# Patient Record
Sex: MALE | Race: BLACK OR AFRICAN AMERICAN | Employment: OTHER | URBAN - METROPOLITAN AREA
[De-identification: names, ages, dates, MRNs, and addresses within clinical notes are randomized per-mention and may not be internally consistent; named-entity substitution may affect disease eponyms.]

---

## 2018-07-01 ENCOUNTER — APPOINTMENT (OUTPATIENT)
Dept: GENERAL RADIOLOGY | Facility: HOSPITAL | Age: 73
DRG: 293 | End: 2018-07-01
Attending: EMERGENCY MEDICINE
Payer: MEDICARE

## 2018-07-01 ENCOUNTER — HOSPITAL ENCOUNTER (INPATIENT)
Facility: HOSPITAL | Age: 73
LOS: 3 days | Discharge: HOME HEALTH | DRG: 293 | End: 2018-07-04
Attending: EMERGENCY MEDICINE | Admitting: INTERNAL MEDICINE
Payer: MEDICARE

## 2018-07-01 DIAGNOSIS — R77.8 ELEVATED TROPONIN: ICD-10-CM

## 2018-07-01 DIAGNOSIS — I50.9 ACUTE ON CHRONIC CONGESTIVE HEART FAILURE, UNSPECIFIED HEART FAILURE TYPE (HCC): Primary | ICD-10-CM

## 2018-07-01 PROBLEM — D64.9 ANEMIA: Status: ACTIVE | Noted: 2018-07-01

## 2018-07-01 PROBLEM — R79.89 ELEVATED TROPONIN: Status: ACTIVE | Noted: 2018-07-01

## 2018-07-01 PROBLEM — R73.9 HYPERGLYCEMIA: Status: ACTIVE | Noted: 2018-07-01

## 2018-07-01 PROCEDURE — 84484 ASSAY OF TROPONIN QUANT: CPT | Performed by: EMERGENCY MEDICINE

## 2018-07-01 PROCEDURE — 80061 LIPID PANEL: CPT | Performed by: EMERGENCY MEDICINE

## 2018-07-01 PROCEDURE — 85610 PROTHROMBIN TIME: CPT | Performed by: INTERNAL MEDICINE

## 2018-07-01 PROCEDURE — 83036 HEMOGLOBIN GLYCOSYLATED A1C: CPT | Performed by: INTERNAL MEDICINE

## 2018-07-01 PROCEDURE — 71045 X-RAY EXAM CHEST 1 VIEW: CPT | Performed by: EMERGENCY MEDICINE

## 2018-07-01 PROCEDURE — 93010 ELECTROCARDIOGRAM REPORT: CPT | Performed by: EMERGENCY MEDICINE

## 2018-07-01 PROCEDURE — 93005 ELECTROCARDIOGRAM TRACING: CPT

## 2018-07-01 PROCEDURE — 84484 ASSAY OF TROPONIN QUANT: CPT | Performed by: INTERNAL MEDICINE

## 2018-07-01 PROCEDURE — 80048 BASIC METABOLIC PNL TOTAL CA: CPT | Performed by: EMERGENCY MEDICINE

## 2018-07-01 PROCEDURE — 80053 COMPREHEN METABOLIC PANEL: CPT | Performed by: INTERNAL MEDICINE

## 2018-07-01 PROCEDURE — 85025 COMPLETE CBC W/AUTO DIFF WBC: CPT | Performed by: INTERNAL MEDICINE

## 2018-07-01 PROCEDURE — 85025 COMPLETE CBC W/AUTO DIFF WBC: CPT | Performed by: EMERGENCY MEDICINE

## 2018-07-01 PROCEDURE — 82962 GLUCOSE BLOOD TEST: CPT

## 2018-07-01 PROCEDURE — 96374 THER/PROPH/DIAG INJ IV PUSH: CPT

## 2018-07-01 PROCEDURE — 99285 EMERGENCY DEPT VISIT HI MDM: CPT

## 2018-07-01 PROCEDURE — 83880 ASSAY OF NATRIURETIC PEPTIDE: CPT | Performed by: EMERGENCY MEDICINE

## 2018-07-01 PROCEDURE — 85730 THROMBOPLASTIN TIME PARTIAL: CPT | Performed by: INTERNAL MEDICINE

## 2018-07-01 PROCEDURE — A4216 STERILE WATER/SALINE, 10 ML: HCPCS

## 2018-07-01 RX ORDER — METOPROLOL SUCCINATE 200 MG/1
200 TABLET, EXTENDED RELEASE ORAL DAILY
Status: ON HOLD | COMMUNITY
End: 2018-07-01 | Stop reason: CLARIF

## 2018-07-01 RX ORDER — FUROSEMIDE 10 MG/ML
40 INJECTION INTRAMUSCULAR; INTRAVENOUS 3 TIMES DAILY
Status: DISCONTINUED | OUTPATIENT
Start: 2018-07-01 | End: 2018-07-03

## 2018-07-01 RX ORDER — FUROSEMIDE 20 MG/1
20 TABLET ORAL 3 TIMES DAILY
Status: DISCONTINUED | OUTPATIENT
Start: 2018-07-01 | End: 2018-07-01

## 2018-07-01 RX ORDER — FUROSEMIDE 20 MG/1
20 TABLET ORAL 3 TIMES DAILY
Status: ON HOLD | COMMUNITY
End: 2018-07-04

## 2018-07-01 RX ORDER — METOPROLOL SUCCINATE 25 MG/1
25 TABLET, EXTENDED RELEASE ORAL DAILY
Status: DISCONTINUED | OUTPATIENT
Start: 2018-07-01 | End: 2018-07-02

## 2018-07-01 RX ORDER — ASPIRIN 81 MG/1
324 TABLET, CHEWABLE ORAL ONCE
Status: COMPLETED | OUTPATIENT
Start: 2018-07-01 | End: 2018-07-01

## 2018-07-01 RX ORDER — BENZONATATE 100 MG/1
200 CAPSULE ORAL 3 TIMES DAILY PRN
Status: DISCONTINUED | OUTPATIENT
Start: 2018-07-01 | End: 2018-07-04

## 2018-07-01 RX ORDER — FUROSEMIDE 10 MG/ML
40 INJECTION INTRAMUSCULAR; INTRAVENOUS ONCE
Status: COMPLETED | OUTPATIENT
Start: 2018-07-01 | End: 2018-07-01

## 2018-07-01 RX ORDER — DICYCLOMINE HCL 20 MG
20 TABLET ORAL 3 TIMES DAILY PRN
Status: DISCONTINUED | OUTPATIENT
Start: 2018-07-01 | End: 2018-07-04

## 2018-07-01 RX ORDER — NITROGLYCERIN 0.4 MG/1
0.4 TABLET SUBLINGUAL ONCE
Status: COMPLETED | OUTPATIENT
Start: 2018-07-01 | End: 2018-07-01

## 2018-07-01 RX ORDER — PANTOPRAZOLE SODIUM 40 MG/1
40 TABLET, DELAYED RELEASE ORAL
Status: DISCONTINUED | OUTPATIENT
Start: 2018-07-02 | End: 2018-07-04

## 2018-07-01 RX ORDER — LOSARTAN POTASSIUM 50 MG/1
50 TABLET ORAL DAILY
Status: DISCONTINUED | OUTPATIENT
Start: 2018-07-01 | End: 2018-07-01

## 2018-07-01 RX ORDER — HEPARIN SODIUM 5000 [USP'U]/ML
5000 INJECTION, SOLUTION INTRAVENOUS; SUBCUTANEOUS EVERY 8 HOURS SCHEDULED
Status: DISCONTINUED | OUTPATIENT
Start: 2018-07-01 | End: 2018-07-04

## 2018-07-01 RX ORDER — LOSARTAN POTASSIUM 25 MG/1
50 TABLET ORAL DAILY
Status: ON HOLD | COMMUNITY
End: 2018-07-04

## 2018-07-01 RX ORDER — ACETAMINOPHEN 325 MG/1
650 TABLET ORAL EVERY 6 HOURS PRN
Status: DISCONTINUED | OUTPATIENT
Start: 2018-07-01 | End: 2018-07-04

## 2018-07-01 RX ORDER — METOPROLOL SUCCINATE 25 MG/1
25 TABLET, EXTENDED RELEASE ORAL DAILY
Status: ON HOLD | COMMUNITY
End: 2018-07-04

## 2018-07-01 RX ORDER — LOSARTAN POTASSIUM 100 MG/1
100 TABLET ORAL DAILY
Status: DISCONTINUED | OUTPATIENT
Start: 2018-07-02 | End: 2018-07-02

## 2018-07-01 RX ORDER — CETIRIZINE HYDROCHLORIDE 10 MG/1
10 TABLET ORAL DAILY
Status: DISCONTINUED | OUTPATIENT
Start: 2018-07-01 | End: 2018-07-04

## 2018-07-01 RX ORDER — FUROSEMIDE 40 MG/1
40 TABLET ORAL 3 TIMES DAILY
Status: DISCONTINUED | OUTPATIENT
Start: 2018-07-01 | End: 2018-07-01

## 2018-07-01 RX ORDER — AMLODIPINE BESYLATE 2.5 MG/1
2.5 TABLET ORAL DAILY
Status: ON HOLD | COMMUNITY
End: 2018-07-01 | Stop reason: CLARIF

## 2018-07-01 RX ORDER — 0.9 % SODIUM CHLORIDE 0.9 %
VIAL (ML) INJECTION
Status: COMPLETED
Start: 2018-07-01 | End: 2018-07-01

## 2018-07-01 NOTE — PLAN OF CARE
Problem: Patient Centered Care  Goal: Patient preferences are identified and integrated in the patient's plan of care  Interventions:  - What would you like us to know as we care for you? I live in [place] with my wife.  I did not like my treatment with my [facility] or at baseline  INTERVENTIONS:  - Continuous cardiac monitoring, monitor vital signs, obtain 12 lead EKG if indicated  - Evaluate effectiveness of antiarrhythmic and heart rate control medications as ordered  - Initiate emergency measures for life threaten

## 2018-07-01 NOTE — CONSULTS
[facility]    Report of Consultation    Alba Aranda Patient Status:  Inpatient    1945 MRN [id number]   Location [facility] 3W/SW Attending Mimi Martin MD   Hosp Day # 0 PCP No primary care provider on file. Admission:  furosemide 20 MG Oral Tab Take 20 mg by mouth 3 (three) times daily. Losartan Potassium 25 MG Oral Tab Take 50 mg by mouth daily. Metoprolol Succinate ER 25 MG Oral Tablet 24 Hr Take 25 mg by mouth daily.        Allergies    Iodinated Conda Wilbert  07/01/2018   CO2 28 07/01/2018    (H) 07/01/2018   CA 8.8 07/01/2018   TROP 0.03 07/01/2018         Imaging:  Xr Chest Ap/pa (1 View) (cpt=71045)    Result Date: 7/1/2018  CONCLUSION:   Moderate interstitial edema.   Preliminary report was s

## 2018-07-01 NOTE — CONSULTS
Cardiology consult    Case reviewed and discussed with ER doctor, we will do an echo , increase lasix 40 IV TID and see patient today, full consult will follow       Thank you for allowing me to participate in the care of your patient.  please call if you h

## 2018-07-02 ENCOUNTER — APPOINTMENT (OUTPATIENT)
Dept: CV DIAGNOSTICS | Facility: HOSPITAL | Age: 73
DRG: 293 | End: 2018-07-02
Attending: HOSPITALIST
Payer: MEDICARE

## 2018-07-02 PROCEDURE — 82962 GLUCOSE BLOOD TEST: CPT

## 2018-07-02 PROCEDURE — A4216 STERILE WATER/SALINE, 10 ML: HCPCS

## 2018-07-02 PROCEDURE — 93306 TTE W/DOPPLER COMPLETE: CPT | Performed by: HOSPITALIST

## 2018-07-02 PROCEDURE — 85027 COMPLETE CBC AUTOMATED: CPT | Performed by: INTERNAL MEDICINE

## 2018-07-02 PROCEDURE — 80053 COMPREHEN METABOLIC PANEL: CPT | Performed by: INTERNAL MEDICINE

## 2018-07-02 RX ORDER — METOPROLOL SUCCINATE 50 MG/1
50 TABLET, EXTENDED RELEASE ORAL DAILY
Status: DISCONTINUED | OUTPATIENT
Start: 2018-07-03 | End: 2018-07-04

## 2018-07-02 RX ORDER — SPIRONOLACTONE 25 MG/1
25 TABLET ORAL DAILY
Status: DISCONTINUED | OUTPATIENT
Start: 2018-07-02 | End: 2018-07-04

## 2018-07-02 RX ORDER — 0.9 % SODIUM CHLORIDE 0.9 %
VIAL (ML) INJECTION
Status: COMPLETED
Start: 2018-07-02 | End: 2018-07-02

## 2018-07-02 NOTE — PROGRESS NOTES
Cardiology progress note     7/2/2018    Objective finding: record recovered from [place]    Physical Exam:   Blood pressure 143/72, pulse 95, temperature 98.4 °F (36.9 °C), temperature source Oral, resp.  rate 20, height 177.8 cm (5' 10\"), weight 197 lb 1.6 oz Elizabeth Hayes MD on 7/01/2018 at 7:41           [facility] 5/2018 normal coronaries from the [address]  EKG NSR, LVH, nonspecific ST-T wave changes     Impression/ Recommendations:     1 Acute on chronic congestive heart failure, unspecified heart failure type ([address] already on

## 2018-07-02 NOTE — H&P
[facility]    PATIENT'S NAME: Anmol Chase   ATTENDING PHYSICIAN: Naveen Tay MD   PATIENT ACCOUNT#:   [id number]    LOCATION:  [facility] [address] RECORD #:   [id number]       YOB: 1945  ADMISSION DATE:       07/01 performed and was negative except as per HPI. PHYSICAL EXAMINATION:    VITAL SIGNS:  Temperature of 98.6, pulse rate of 99, respiratory rate of 22, blood pressure 173/105, pulse ox of 98%. NECK:  No JVD. No bruit. No lymphadenopathy.   LUNGS:  Clear blockages. Further evaluation as per Cardiology. 2.   Hyperlipidemia. The patient is on diet control and does not need any medications. 3.   Diabetes.   The patient reports that he has a history of diabetes for many years but has been controlling it wit

## 2018-07-02 NOTE — PROGRESS NOTES
[facility]    Progress Note    Zejulio Montague Patient Status:  Inpatient    1945 MRN [id number]   Location [facility] 3W/SW Attending Nicole Paul MD   Hosp Day # 1 PCP No primary care provider on file.        Subjec Intravenous, TID  •  Dicyclomine HCl (BENTYL) tab 20 mg, 20 mg, Oral, TID PRN  •  Pantoprazole Sodium (PROTONIX) EC tab 40 mg, 40 mg, Oral, QAM AC  •  benzonatate (TESSALON) cap 200 mg, 200 mg, Oral, TID PRN  •  cetirizine (ZYRTEC) tab 10 mg, 10 mg, Oral, Report  Interpretation  -------------------------- Sinus Rhythm -Left atrial enlargement. Possible left ventricular hypertrophy on non-voltage basis.   -Nonspecific ST depression + T-abnormality -Seen with left ventricular hypertrophy (strain) or digitalis

## 2018-07-02 NOTE — PLAN OF CARE
Problem: Patient/Family Goals  Goal: Patient/Family Long Term Goal  Patient's Long Term Goal: Maintain my heart condition to prevent hospitalization of CHF    Interventions:  - CHF clinic outpatient  - Cardiology follow up outpatient  - Con't oral diuretic mentation and behavior  - Position to facilitate oxygenation and minimize respiratory effort  - Oxygen supplementation based on oxygen saturation or ABGs  - Provide Smoking Cessation handout, if applicable  - Encourage broncho-pulmonary hygiene including c

## 2018-07-03 PROCEDURE — 82962 GLUCOSE BLOOD TEST: CPT

## 2018-07-03 PROCEDURE — 80053 COMPREHEN METABOLIC PANEL: CPT | Performed by: HOSPITALIST

## 2018-07-03 PROCEDURE — 83880 ASSAY OF NATRIURETIC PEPTIDE: CPT | Performed by: HOSPITALIST

## 2018-07-03 PROCEDURE — A4216 STERILE WATER/SALINE, 10 ML: HCPCS

## 2018-07-03 RX ORDER — POTASSIUM CHLORIDE 20 MEQ/1
40 TABLET, EXTENDED RELEASE ORAL ONCE
Status: COMPLETED | OUTPATIENT
Start: 2018-07-03 | End: 2018-07-03

## 2018-07-03 RX ORDER — FUROSEMIDE 40 MG/1
40 TABLET ORAL DAILY
Status: DISCONTINUED | OUTPATIENT
Start: 2018-07-04 | End: 2018-07-04

## 2018-07-03 RX ORDER — 0.9 % SODIUM CHLORIDE 0.9 %
VIAL (ML) INJECTION
Status: COMPLETED
Start: 2018-07-03 | End: 2018-07-03

## 2018-07-03 NOTE — CDS QUERY
cdi update: query answered in Dr. Robin Andujar progress note dated 07/04 at [address]  Dear Doctor: Diego Rodriguez information (provided below) includes a diagnosis of Heart Failure.  For accurate

## 2018-07-03 NOTE — PROGRESS NOTES
Cardiology progress note     7/3/2018    Objective finding: record recovered from [place]    Physical Exam:   Blood pressure 136/80, pulse 86, temperature 98.4 °F (36.9 °C), temperature source Oral, resp.  rate 20, height 177.8 cm (5' 10\"), weight 197 lb 6.4 oz normal CI and mildly elevated filling pressures  - Patient is NICM, if EF 20-25% life vest today  - losartan switched to entresto, entresto dose noreen be increased in 2-4 W  - aldactone 25 added  - metoprolol XL  50 daily        2 Elevated troponin: mildly e

## 2018-07-04 VITALS
OXYGEN SATURATION: 97 % | SYSTOLIC BLOOD PRESSURE: 164 MMHG | TEMPERATURE: 98 F | WEIGHT: 195.31 LBS | HEART RATE: 78 BPM | RESPIRATION RATE: 20 BRPM | BODY MASS INDEX: 27.96 KG/M2 | DIASTOLIC BLOOD PRESSURE: 99 MMHG | HEIGHT: 70 IN

## 2018-07-04 PROCEDURE — 84132 ASSAY OF SERUM POTASSIUM: CPT | Performed by: INTERNAL MEDICINE

## 2018-07-04 PROCEDURE — 80053 COMPREHEN METABOLIC PANEL: CPT | Performed by: INTERNAL MEDICINE

## 2018-07-04 PROCEDURE — 82962 GLUCOSE BLOOD TEST: CPT

## 2018-07-04 PROCEDURE — 83735 ASSAY OF MAGNESIUM: CPT | Performed by: INTERNAL MEDICINE

## 2018-07-04 PROCEDURE — 84100 ASSAY OF PHOSPHORUS: CPT | Performed by: INTERNAL MEDICINE

## 2018-07-04 RX ORDER — BENZONATATE 200 MG/1
200 CAPSULE ORAL 3 TIMES DAILY PRN
Qty: 45 CAPSULE | Refills: 0 | Status: SHIPPED | OUTPATIENT
Start: 2018-07-04 | End: 2018-07-25

## 2018-07-04 RX ORDER — CETIRIZINE HYDROCHLORIDE 10 MG/1
10 TABLET ORAL DAILY
Qty: 30 TABLET | Refills: 0 | Status: SHIPPED | OUTPATIENT
Start: 2018-07-05 | End: 2018-01-01 | Stop reason: ALTCHOICE

## 2018-07-04 RX ORDER — FUROSEMIDE 40 MG/1
40 TABLET ORAL DAILY
Qty: 30 TABLET | Refills: 0 | Status: SHIPPED | OUTPATIENT
Start: 2018-07-05 | End: 2018-07-25

## 2018-07-04 RX ORDER — SPIRONOLACTONE 25 MG/1
25 TABLET ORAL DAILY
Qty: 30 TABLET | Refills: 0 | Status: SHIPPED | OUTPATIENT
Start: 2018-07-05 | End: 2018-07-25

## 2018-07-04 RX ORDER — METOPROLOL SUCCINATE 50 MG/1
50 TABLET, EXTENDED RELEASE ORAL DAILY
Qty: 30 TABLET | Refills: 0 | Status: SHIPPED | OUTPATIENT
Start: 2018-07-05 | End: 2018-07-25

## 2018-07-04 NOTE — PROGRESS NOTES
[facility]  Progress Note    Junnie Headings Patient Status:  Inpatient    1945 MRN [id number]   Location [facility] 3W/SW Attending Mikel Sotomayor MD   Hosp Day # 3 PCP No primary care provider on file.      Subjective:  Patien Neurologic: Alert and oriented, normal affect. Skin: Warm and dry. Laboratory/Data:  Diagnostics:     Echo 7/2/18  Study Conclusions  1. Left ventricle: The cavity size was normal. Systolic function     was severely reduced.  The estimated ejection fra Current Facility-Administered Medications:  furosemide (LASIX) tab 40 mg 40 mg Oral Daily   Metoprolol Succinate ER (Toprol XL) 24 hr tab 50 mg 50 mg Oral Daily   spironolactone (ALDACTONE) tab 25 mg 25 mg Oral Daily   Sacubitril-Valsartan (ENTRESTO) 49-51

## 2018-07-04 NOTE — PROGRESS NOTES
[facility]    Progress Note    Kobi Flores Patient Status:  Inpatient    1945 MRN [id number]   Location [facility] 3W/SW Attending John Ortiz MD   Hosp Day # 2 PCP No primary care provider on file.        Subjec TID  •  Dicyclomine HCl (BENTYL) tab 20 mg, 20 mg, Oral, TID PRN  •  Pantoprazole Sodium (PROTONIX) EC tab 40 mg, 40 mg, Oral, QAM AC  •  benzonatate (TESSALON) cap 200 mg, 200 mg, Oral, TID PRN  •  cetirizine (ZYRTEC) tab 10 mg, 10 mg, Oral, Daily    Edwar

## 2018-07-04 NOTE — PROGRESS NOTES
[facility]    Progress Note    Sybil Montague Patient Status:  Inpatient    1945 MRN [id number]   Location [facility] 3W/SW Attending Nicole Paul MD   Hosp Day # 3 PCP No primary care provider on file.        Subjec Oral, TID PRN  •  Pantoprazole Sodium (PROTONIX) EC tab 40 mg, 40 mg, Oral, QAM AC  •  benzonatate (TESSALON) cap 200 mg, 200 mg, Oral, TID PRN  •  cetirizine (ZYRTEC) tab 10 mg, 10 mg, Oral, Daily    Allergies    Iodinated Diagnosti*    NAUSEA AND VOMITIN

## 2018-07-04 NOTE — PLAN OF CARE
Problem: CARDIOVASCULAR - ADULT  Goal: Maintains optimal cardiac output and hemodynamic stability  INTERVENTIONS:  - Monitor vital signs, rhythm, and trends  - Monitor for bleeding, hypotension and signs of decreased cardiac output  - Evaluate effectivenes Discharge      Problem: Diabetes/Glucose Control  Goal: Glucose maintained within prescribed range  INTERVENTIONS:  - Monitor Blood Glucose as ordered  - Assess for signs and symptoms of hyperglycemia and hypoglycemia  - Administer ordered medications to m

## 2018-07-04 NOTE — PLAN OF CARE
Problem: Patient/Family Goals  Goal: Patient/Family Long Term Goal  Patient's Long Term Goal: Maintain my heart condition to prevent hospitalization of CHF    Interventions:  - CHF clinic outpatient  - Cardiology follow up outpatient  - Con't oral diuretic oxygen saturation or ABGs  - Provide Smoking Cessation handout, if applicable  - Encourage broncho-pulmonary hygiene including cough, deep breathe, Incentive Spirometry  - Assess the need for suctioning and perform as needed  - Assess and instruct to repor

## 2018-07-05 ENCOUNTER — TELEPHONE (OUTPATIENT)
Dept: CARDIOLOGY UNIT | Facility: HOSPITAL | Age: 73
End: 2018-07-05

## 2018-07-11 ENCOUNTER — OFFICE VISIT (OUTPATIENT)
Dept: CARDIOLOGY CLINIC | Facility: HOSPITAL | Age: 73
End: 2018-07-11
Attending: CLINICAL NURSE SPECIALIST
Payer: MEDICARE

## 2018-07-11 VITALS
HEART RATE: 76 BPM | WEIGHT: 202 LBS | OXYGEN SATURATION: 100 % | SYSTOLIC BLOOD PRESSURE: 112 MMHG | BODY MASS INDEX: 29 KG/M2 | DIASTOLIC BLOOD PRESSURE: 66 MMHG

## 2018-07-11 DIAGNOSIS — I50.9 HEART FAILURE, UNSPECIFIED (HCC): Primary | ICD-10-CM

## 2018-07-11 DIAGNOSIS — I50.23 ACUTE ON CHRONIC SYSTOLIC HEART FAILURE (HCC): ICD-10-CM

## 2018-07-11 PROBLEM — I10 HTN (HYPERTENSION): Status: ACTIVE | Noted: 2018-07-11

## 2018-07-11 LAB
ANION GAP SERPL CALC-SCNC: 6 MMOL/L (ref 0–18)
BUN SERPL-MCNC: 11 MG/DL (ref 8–20)
BUN/CREAT SERPL: 11.8 (ref 10–20)
CALCIUM SERPL-MCNC: 9.1 MG/DL (ref 8.5–10.5)
CHLORIDE SERPL-SCNC: 106 MMOL/L (ref 95–110)
CO2 SERPL-SCNC: 26 MMOL/L (ref 22–32)
CREAT SERPL-MCNC: 0.93 MG/DL (ref 0.5–1.5)
GLUCOSE SERPL-MCNC: 225 MG/DL (ref 70–99)
OSMOLALITY UR CALC.SUM OF ELEC: 292 MOSM/KG (ref 275–295)
POTASSIUM SERPL-SCNC: 4 MMOL/L (ref 3.3–5.1)
SODIUM SERPL-SCNC: 138 MMOL/L (ref 136–144)

## 2018-07-11 PROCEDURE — 80048 BASIC METABOLIC PNL TOTAL CA: CPT | Performed by: CLINICAL NURSE SPECIALIST

## 2018-07-11 PROCEDURE — 99214 OFFICE O/P EST MOD 30 MIN: CPT | Performed by: CLINICAL NURSE SPECIALIST

## 2018-07-11 PROCEDURE — 99211 OFF/OP EST MAY X REQ PHY/QHP: CPT | Performed by: CLINICAL NURSE SPECIALIST

## 2018-07-11 PROCEDURE — 36415 COLL VENOUS BLD VENIPUNCTURE: CPT | Performed by: CLINICAL NURSE SPECIALIST

## 2018-07-11 NOTE — PATIENT INSTRUCTIONS
Continue current medications    Continue tracking daily weights. Call with weight gain of 3 lbs overnight or concerning symptoms. [phone]    32-52 oz fluid restriction    Less than 2000 mg sodium/salt diet.  Common high sodium foods include frozen di

## 2018-07-11 NOTE — PROGRESS NOTES
[facility] Patient Status:  Outpatient    1945 MRN [id number]   Location [address] [place] [address] No primary care provider on file.   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 Now wearing LifeVest. Reviewed purpose of LifeVest and disease process of heart failure. Saw Dr Bryon Miller yesterday. Plan to increase Entresto to 97/103 mg BID starting 7/15.  Next appointment with Dr Bryon Miller and Dr Leonel Jernigan sc

## 2018-07-19 PROBLEM — R73.03 PREDIABETES: Status: ACTIVE | Noted: 2018-07-19

## 2018-07-19 PROBLEM — R73.9 HYPERGLYCEMIA: Status: RESOLVED | Noted: 2018-07-01 | Resolved: 2018-07-19

## 2018-08-01 ENCOUNTER — OFFICE VISIT (OUTPATIENT)
Dept: CARDIOLOGY CLINIC | Facility: HOSPITAL | Age: 73
End: 2018-08-01
Attending: INTERNAL MEDICINE
Payer: MEDICARE

## 2018-08-01 VITALS
OXYGEN SATURATION: 97 % | BODY MASS INDEX: 31 KG/M2 | DIASTOLIC BLOOD PRESSURE: 68 MMHG | SYSTOLIC BLOOD PRESSURE: 122 MMHG | HEART RATE: 86 BPM | WEIGHT: 204.81 LBS

## 2018-08-01 DIAGNOSIS — I50.9 HEART FAILURE, UNSPECIFIED (HCC): Primary | ICD-10-CM

## 2018-08-01 DIAGNOSIS — I50.23 ACUTE ON CHRONIC SYSTOLIC HEART FAILURE (HCC): ICD-10-CM

## 2018-08-01 LAB
ANION GAP SERPL CALC-SCNC: 10 MMOL/L (ref 0–18)
BUN SERPL-MCNC: 8 MG/DL (ref 8–20)
BUN/CREAT SERPL: 8.9 (ref 10–20)
CALCIUM SERPL-MCNC: 9.3 MG/DL (ref 8.5–10.5)
CHLORIDE SERPL-SCNC: 101 MMOL/L (ref 95–110)
CO2 SERPL-SCNC: 29 MMOL/L (ref 22–32)
CREAT SERPL-MCNC: 0.9 MG/DL (ref 0.5–1.5)
GLUCOSE SERPL-MCNC: 156 MG/DL (ref 70–99)
OSMOLALITY UR CALC.SUM OF ELEC: 292 MOSM/KG (ref 275–295)
POTASSIUM SERPL-SCNC: 3.9 MMOL/L (ref 3.3–5.1)
SODIUM SERPL-SCNC: 140 MMOL/L (ref 136–144)

## 2018-08-01 PROCEDURE — 80048 BASIC METABOLIC PNL TOTAL CA: CPT | Performed by: CLINICAL NURSE SPECIALIST

## 2018-08-01 PROCEDURE — 99214 OFFICE O/P EST MOD 30 MIN: CPT | Performed by: CLINICAL NURSE SPECIALIST

## 2018-08-01 PROCEDURE — 99211 OFF/OP EST MAY X REQ PHY/QHP: CPT | Performed by: CLINICAL NURSE SPECIALIST

## 2018-08-01 PROCEDURE — 36415 COLL VENOUS BLD VENIPUNCTURE: CPT | Performed by: CLINICAL NURSE SPECIALIST

## 2018-08-01 NOTE — PATIENT INSTRUCTIONS
Continue current medications    Continue tracking daily weights. Call with weight gain of 3 lbs overnight or concerning symptoms. [phone]    32-52 oz fluid restriction    Less than 2000 mg sodium/salt diet.  Common high sodium foods include frozen di

## 2018-08-01 NOTE — PROGRESS NOTES
Ricky Larsen  Patient Status:  Outpatient    1945 MRN [id number]   Location [facility] No primary care provider on file.   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 Now wearing LifeVest. Reviewed purpose of LifeVest and disease process of heart failure. Two weeks ago, increased Entresto to max dose,  mg BID.  Ne

## 2018-08-10 NOTE — DISCHARGE SUMMARY
[facility]    PATIENT'S NAME: Michelle Allison   ATTENDING PHYSICIAN: Carrillo Smart MD   PATIENT ACCOUNT#:   [id number]    LOCATION:  [facility] [address] RECORD #:   [id number]       YOB: 1945  ADMISSION DATE:       07/01

## 2018-11-01 PROBLEM — I50.22 CHRONIC SYSTOLIC HEART FAILURE (HCC): Status: ACTIVE | Noted: 2018-07-11

## 2019-01-01 ENCOUNTER — APPOINTMENT (OUTPATIENT)
Dept: GENERAL RADIOLOGY | Facility: HOSPITAL | Age: 74
DRG: 291 | End: 2019-01-01
Attending: EMERGENCY MEDICINE
Payer: MEDICARE

## 2019-01-01 ENCOUNTER — OFFICE VISIT (OUTPATIENT)
Dept: HEMATOLOGY/ONCOLOGY | Facility: HOSPITAL | Age: 74
End: 2019-01-01
Attending: INTERNAL MEDICINE
Payer: MEDICARE

## 2019-01-01 ENCOUNTER — HOSPITAL ENCOUNTER (INPATIENT)
Facility: HOSPITAL | Age: 74
LOS: 3 days | Discharge: HOSPICE | DRG: 291 | End: 2019-01-01
Attending: EMERGENCY MEDICINE | Admitting: HOSPITALIST
Payer: MEDICARE

## 2019-01-01 ENCOUNTER — TELEPHONE (OUTPATIENT)
Dept: HEMATOLOGY/ONCOLOGY | Facility: HOSPITAL | Age: 74
End: 2019-01-01

## 2019-01-01 ENCOUNTER — APPOINTMENT (OUTPATIENT)
Dept: CV DIAGNOSTICS | Facility: HOSPITAL | Age: 74
DRG: 291 | End: 2019-01-01
Attending: NURSE PRACTITIONER
Payer: MEDICARE

## 2019-01-01 ENCOUNTER — LAB ENCOUNTER (OUTPATIENT)
Dept: LAB | Facility: HOSPITAL | Age: 74
End: 2019-01-01
Attending: INTERNAL MEDICINE
Payer: MEDICARE

## 2019-01-01 ENCOUNTER — APPOINTMENT (OUTPATIENT)
Dept: GENERAL RADIOLOGY | Facility: HOSPITAL | Age: 74
DRG: 291 | End: 2019-01-01
Attending: INTERNAL MEDICINE
Payer: MEDICARE

## 2019-01-01 ENCOUNTER — APPOINTMENT (OUTPATIENT)
Dept: CT IMAGING | Facility: HOSPITAL | Age: 74
DRG: 291 | End: 2019-01-01
Attending: HOSPITALIST
Payer: MEDICARE

## 2019-01-01 ENCOUNTER — APPOINTMENT (OUTPATIENT)
Dept: LAB | Facility: HOSPITAL | Age: 74
End: 2019-01-01
Attending: PHYSICIAN ASSISTANT
Payer: MEDICARE

## 2019-01-01 ENCOUNTER — HOSPITAL ENCOUNTER (INPATIENT)
Facility: HOSPITAL | Age: 74
LOS: 1 days | DRG: 291 | End: 2019-01-01
Attending: HOSPITALIST | Admitting: HOSPITALIST
Payer: OTHER MISCELLANEOUS

## 2019-01-01 VITALS
WEIGHT: 192.44 LBS | OXYGEN SATURATION: 100 % | HEIGHT: 70 IN | HEART RATE: 119 BPM | TEMPERATURE: 98 F | BODY MASS INDEX: 27.55 KG/M2 | DIASTOLIC BLOOD PRESSURE: 60 MMHG | SYSTOLIC BLOOD PRESSURE: 77 MMHG | RESPIRATION RATE: 37 BRPM

## 2019-01-01 VITALS
SYSTOLIC BLOOD PRESSURE: 135 MMHG | HEART RATE: 103 BPM | OXYGEN SATURATION: 98 % | DIASTOLIC BLOOD PRESSURE: 62 MMHG | TEMPERATURE: 99 F | RESPIRATION RATE: 16 BRPM

## 2019-01-01 VITALS
HEART RATE: 87 BPM | DIASTOLIC BLOOD PRESSURE: 61 MMHG | SYSTOLIC BLOOD PRESSURE: 117 MMHG | RESPIRATION RATE: 18 BRPM | OXYGEN SATURATION: 100 % | TEMPERATURE: 98 F

## 2019-01-01 VITALS
RESPIRATION RATE: 18 BRPM | DIASTOLIC BLOOD PRESSURE: 46 MMHG | HEART RATE: 65 BPM | SYSTOLIC BLOOD PRESSURE: 112 MMHG | TEMPERATURE: 98 F | OXYGEN SATURATION: 100 %

## 2019-01-01 VITALS
SYSTOLIC BLOOD PRESSURE: 143 MMHG | HEART RATE: 137 BPM | DIASTOLIC BLOOD PRESSURE: 106 MMHG | RESPIRATION RATE: 36 BRPM | TEMPERATURE: 103 F | OXYGEN SATURATION: 72 %

## 2019-01-01 DIAGNOSIS — C16.3 MALIGNANT NEOPLASM OF PYLORIC ANTRUM (HCC): ICD-10-CM

## 2019-01-01 DIAGNOSIS — C78.00 MALIGNANT NEOPLASM METASTATIC TO LUNG, UNSPECIFIED LATERALITY (HCC): ICD-10-CM

## 2019-01-01 DIAGNOSIS — D64.9 ANEMIA, UNSPECIFIED TYPE: Primary | ICD-10-CM

## 2019-01-01 DIAGNOSIS — J96.01 ACUTE HYPOXEMIC RESPIRATORY FAILURE (HCC): Primary | ICD-10-CM

## 2019-01-01 DIAGNOSIS — D64.9 ANEMIA, UNSPECIFIED TYPE: ICD-10-CM

## 2019-01-01 DIAGNOSIS — D64.9 ANEMIA: Primary | ICD-10-CM

## 2019-01-01 DIAGNOSIS — D64.9 ANEMIA: ICD-10-CM

## 2019-01-01 DIAGNOSIS — R59.0 ABDOMINAL LYMPHADENOPATHY: ICD-10-CM

## 2019-01-01 PROCEDURE — 71250 CT THORAX DX C-: CPT | Performed by: HOSPITALIST

## 2019-01-01 PROCEDURE — 36430 TRANSFUSION BLD/BLD COMPNT: CPT

## 2019-01-01 PROCEDURE — 94640 AIRWAY INHALATION TREATMENT: CPT

## 2019-01-01 PROCEDURE — 83516 IMMUNOASSAY NONANTIBODY: CPT | Performed by: NURSE PRACTITIONER

## 2019-01-01 PROCEDURE — 71045 X-RAY EXAM CHEST 1 VIEW: CPT | Performed by: EMERGENCY MEDICINE

## 2019-01-01 PROCEDURE — 86480 TB TEST CELL IMMUN MEASURE: CPT | Performed by: HOSPITALIST

## 2019-01-01 PROCEDURE — 84484 ASSAY OF TROPONIN QUANT: CPT | Performed by: EMERGENCY MEDICINE

## 2019-01-01 PROCEDURE — 86641 CRYPTOCOCCUS ANTIBODY: CPT | Performed by: NURSE PRACTITIONER

## 2019-01-01 PROCEDURE — 83036 HEMOGLOBIN GLYCOSYLATED A1C: CPT | Performed by: NURSE PRACTITIONER

## 2019-01-01 PROCEDURE — 87798 DETECT AGENT NOS DNA AMP: CPT | Performed by: HOSPITALIST

## 2019-01-01 PROCEDURE — 71045 X-RAY EXAM CHEST 1 VIEW: CPT | Performed by: INTERNAL MEDICINE

## 2019-01-01 PROCEDURE — 85018 HEMOGLOBIN: CPT | Performed by: INTERNAL MEDICINE

## 2019-01-01 PROCEDURE — 84145 PROCALCITONIN (PCT): CPT | Performed by: NURSE PRACTITIONER

## 2019-01-01 PROCEDURE — 80048 BASIC METABOLIC PNL TOTAL CA: CPT | Performed by: NURSE PRACTITIONER

## 2019-01-01 PROCEDURE — 85014 HEMATOCRIT: CPT | Performed by: INTERNAL MEDICINE

## 2019-01-01 PROCEDURE — 86900 BLOOD TYPING SEROLOGIC ABO: CPT

## 2019-01-01 PROCEDURE — 36415 COLL VENOUS BLD VENIPUNCTURE: CPT

## 2019-01-01 PROCEDURE — 85025 COMPLETE CBC W/AUTO DIFF WBC: CPT

## 2019-01-01 PROCEDURE — 80048 BASIC METABOLIC PNL TOTAL CA: CPT | Performed by: INTERNAL MEDICINE

## 2019-01-01 PROCEDURE — 86850 RBC ANTIBODY SCREEN: CPT

## 2019-01-01 PROCEDURE — 86038 ANTINUCLEAR ANTIBODIES: CPT | Performed by: NURSE PRACTITIONER

## 2019-01-01 PROCEDURE — 93306 TTE W/DOPPLER COMPLETE: CPT | Performed by: NURSE PRACTITIONER

## 2019-01-01 PROCEDURE — 80053 COMPREHEN METABOLIC PANEL: CPT

## 2019-01-01 PROCEDURE — 99285 EMERGENCY DEPT VISIT HI MDM: CPT

## 2019-01-01 PROCEDURE — 93010 ELECTROCARDIOGRAM REPORT: CPT | Performed by: EMERGENCY MEDICINE

## 2019-01-01 PROCEDURE — 86612 BLASTOMYCES ANTIBODY: CPT | Performed by: NURSE PRACTITIONER

## 2019-01-01 PROCEDURE — 86920 COMPATIBILITY TEST SPIN: CPT

## 2019-01-01 PROCEDURE — 82962 GLUCOSE BLOOD TEST: CPT

## 2019-01-01 PROCEDURE — 96375 TX/PRO/DX INJ NEW DRUG ADDON: CPT

## 2019-01-01 PROCEDURE — 86635 COCCIDIOIDES ANTIBODY: CPT | Performed by: NURSE PRACTITIONER

## 2019-01-01 PROCEDURE — 86255 FLUORESCENT ANTIBODY SCREEN: CPT | Performed by: NURSE PRACTITIONER

## 2019-01-01 PROCEDURE — 80048 BASIC METABOLIC PNL TOTAL CA: CPT | Performed by: EMERGENCY MEDICINE

## 2019-01-01 PROCEDURE — 86698 HISTOPLASMA ANTIBODY: CPT | Performed by: NURSE PRACTITIONER

## 2019-01-01 PROCEDURE — 86850 RBC ANTIBODY SCREEN: CPT | Performed by: INTERNAL MEDICINE

## 2019-01-01 PROCEDURE — 30233N1 TRANSFUSION OF NONAUTOLOGOUS RED BLOOD CELLS INTO PERIPHERAL VEIN, PERCUTANEOUS APPROACH: ICD-10-PCS | Performed by: HOSPITALIST

## 2019-01-01 PROCEDURE — 87633 RESP VIRUS 12-25 TARGETS: CPT | Performed by: INTERNAL MEDICINE

## 2019-01-01 PROCEDURE — 96368 THER/DIAG CONCURRENT INF: CPT

## 2019-01-01 PROCEDURE — 97530 THERAPEUTIC ACTIVITIES: CPT

## 2019-01-01 PROCEDURE — 87486 CHLMYD PNEUM DNA AMP PROBE: CPT | Performed by: INTERNAL MEDICINE

## 2019-01-01 PROCEDURE — 85025 COMPLETE CBC W/AUTO DIFF WBC: CPT | Performed by: NURSE PRACTITIONER

## 2019-01-01 PROCEDURE — 30233N1 TRANSFUSION OF NONAUTOLOGOUS RED BLOOD CELLS INTO PERIPHERAL VEIN, PERCUTANEOUS APPROACH: ICD-10-PCS | Performed by: INTERNAL MEDICINE

## 2019-01-01 PROCEDURE — 83880 ASSAY OF NATRIURETIC PEPTIDE: CPT | Performed by: EMERGENCY MEDICINE

## 2019-01-01 PROCEDURE — 87581 M.PNEUMON DNA AMP PROBE: CPT | Performed by: INTERNAL MEDICINE

## 2019-01-01 PROCEDURE — 93005 ELECTROCARDIOGRAM TRACING: CPT

## 2019-01-01 PROCEDURE — 97535 SELF CARE MNGMENT TRAINING: CPT

## 2019-01-01 PROCEDURE — 83876 ASSAY MYELOPEROXIDASE: CPT | Performed by: NURSE PRACTITIONER

## 2019-01-01 PROCEDURE — 86606 ASPERGILLUS ANTIBODY: CPT | Performed by: NURSE PRACTITIONER

## 2019-01-01 PROCEDURE — 85025 COMPLETE CBC W/AUTO DIFF WBC: CPT | Performed by: EMERGENCY MEDICINE

## 2019-01-01 PROCEDURE — 97166 OT EVAL MOD COMPLEX 45 MIN: CPT

## 2019-01-01 PROCEDURE — 85060 BLOOD SMEAR INTERPRETATION: CPT | Performed by: EMERGENCY MEDICINE

## 2019-01-01 PROCEDURE — 86901 BLOOD TYPING SEROLOGIC RH(D): CPT | Performed by: INTERNAL MEDICINE

## 2019-01-01 PROCEDURE — 87798 DETECT AGENT NOS DNA AMP: CPT | Performed by: INTERNAL MEDICINE

## 2019-01-01 PROCEDURE — 86901 BLOOD TYPING SEROLOGIC RH(D): CPT

## 2019-01-01 PROCEDURE — 96365 THER/PROPH/DIAG IV INF INIT: CPT

## 2019-01-01 PROCEDURE — 86900 BLOOD TYPING SEROLOGIC ABO: CPT | Performed by: INTERNAL MEDICINE

## 2019-01-01 PROCEDURE — 85025 COMPLETE CBC W/AUTO DIFF WBC: CPT | Performed by: INTERNAL MEDICINE

## 2019-01-01 PROCEDURE — 82378 CARCINOEMBRYONIC ANTIGEN: CPT

## 2019-01-01 PROCEDURE — 88305 TISSUE EXAM BY PATHOLOGIST: CPT | Performed by: INTERNAL MEDICINE

## 2019-01-01 PROCEDURE — 97162 PT EVAL MOD COMPLEX 30 MIN: CPT

## 2019-01-01 PROCEDURE — 87641 MR-STAPH DNA AMP PROBE: CPT | Performed by: INTERNAL MEDICINE

## 2019-01-01 PROCEDURE — 86039 ANTINUCLEAR ANTIBODIES (ANA): CPT | Performed by: NURSE PRACTITIONER

## 2019-01-01 RX ORDER — BISACODYL 10 MG
10 SUPPOSITORY, RECTAL RECTAL
Status: DISCONTINUED | OUTPATIENT
Start: 2019-01-01 | End: 2019-01-01

## 2019-01-01 RX ORDER — ACETAMINOPHEN 325 MG/1
650 TABLET ORAL ONCE
Status: CANCELLED | OUTPATIENT
Start: 2019-01-01

## 2019-01-01 RX ORDER — HALOPERIDOL 5 MG/ML
1 INJECTION INTRAMUSCULAR
Status: DISCONTINUED | OUTPATIENT
Start: 2019-01-01 | End: 2019-01-01

## 2019-01-01 RX ORDER — ACETAMINOPHEN 650 MG/1
650 SUPPOSITORY RECTAL EVERY 4 HOURS PRN
Status: DISCONTINUED | OUTPATIENT
Start: 2019-01-01 | End: 2019-01-01

## 2019-01-01 RX ORDER — LORAZEPAM 2 MG/ML
0.5 INJECTION INTRAMUSCULAR EVERY 4 HOURS PRN
Status: DISCONTINUED | OUTPATIENT
Start: 2019-01-01 | End: 2019-01-01

## 2019-01-01 RX ORDER — ATROPINE SULFATE 10 MG/ML
2 SOLUTION/ DROPS OPHTHALMIC EVERY 2 HOUR PRN
Status: DISCONTINUED | OUTPATIENT
Start: 2019-01-01 | End: 2019-01-01

## 2019-01-01 RX ORDER — ACETAMINOPHEN 325 MG/1
650 TABLET ORAL EVERY 6 HOURS PRN
Status: DISCONTINUED | OUTPATIENT
Start: 2019-01-01 | End: 2019-01-01

## 2019-01-01 RX ORDER — DIPHENHYDRAMINE HCL 25 MG
25 CAPSULE ORAL ONCE
Status: CANCELLED | OUTPATIENT
Start: 2019-01-01

## 2019-01-01 RX ORDER — GUAIFENESIN 100 MG/5ML
100 SOLUTION ORAL EVERY 4 HOURS PRN
Status: DISCONTINUED | OUTPATIENT
Start: 2019-01-01 | End: 2019-01-01

## 2019-01-01 RX ORDER — ONDANSETRON 2 MG/ML
4 INJECTION INTRAMUSCULAR; INTRAVENOUS EVERY 6 HOURS PRN
Status: DISCONTINUED | OUTPATIENT
Start: 2019-01-01 | End: 2019-01-01

## 2019-01-01 RX ORDER — 0.9 % SODIUM CHLORIDE 0.9 %
VIAL (ML) INJECTION
Status: DISCONTINUED
Start: 2019-01-01 | End: 2019-01-01

## 2019-01-01 RX ORDER — FUROSEMIDE 10 MG/ML
40 INJECTION INTRAMUSCULAR; INTRAVENOUS EVERY 8 HOURS PRN
Status: DISCONTINUED | OUTPATIENT
Start: 2019-01-01 | End: 2019-01-01

## 2019-01-01 RX ORDER — FUROSEMIDE 10 MG/ML
40 INJECTION INTRAMUSCULAR; INTRAVENOUS ONCE
Status: COMPLETED | OUTPATIENT
Start: 2019-01-01 | End: 2019-01-01

## 2019-01-01 RX ORDER — ACETAMINOPHEN 160 MG/5ML
650 SOLUTION ORAL EVERY 4 HOURS PRN
Status: DISCONTINUED | OUTPATIENT
Start: 2019-01-01 | End: 2019-01-01

## 2019-01-01 RX ORDER — HALOPERIDOL 5 MG/ML
2 INJECTION INTRAMUSCULAR
Status: DISCONTINUED | OUTPATIENT
Start: 2019-01-01 | End: 2019-01-01

## 2019-01-01 RX ORDER — PROCHLORPERAZINE MALEATE 5 MG/1
10 TABLET ORAL EVERY 6 HOURS PRN
Status: DISCONTINUED | OUTPATIENT
Start: 2019-01-01 | End: 2019-01-01

## 2019-01-01 RX ORDER — SODIUM CHLORIDE 9 MG/ML
INJECTION, SOLUTION INTRAVENOUS
Status: DISCONTINUED
Start: 2019-01-01 | End: 2019-01-01

## 2019-01-01 RX ORDER — ACETAMINOPHEN 325 MG/1
TABLET ORAL
Status: DISCONTINUED
Start: 2019-01-01 | End: 2019-01-01

## 2019-01-01 RX ORDER — ACETAMINOPHEN 325 MG/1
650 TABLET ORAL ONCE
Status: COMPLETED | OUTPATIENT
Start: 2019-01-01 | End: 2019-01-01

## 2019-01-01 RX ORDER — PANTOPRAZOLE SODIUM 40 MG/1
40 TABLET, DELAYED RELEASE ORAL
Status: DISCONTINUED | OUTPATIENT
Start: 2019-01-01 | End: 2019-01-01

## 2019-01-01 RX ORDER — FUROSEMIDE 10 MG/ML
40 INJECTION INTRAMUSCULAR; INTRAVENOUS 3 TIMES DAILY
Status: DISCONTINUED | OUTPATIENT
Start: 2019-01-01 | End: 2019-01-01

## 2019-01-01 RX ORDER — IPRATROPIUM BROMIDE AND ALBUTEROL SULFATE 2.5; .5 MG/3ML; MG/3ML
3 SOLUTION RESPIRATORY (INHALATION) EVERY 6 HOURS PRN
Status: DISCONTINUED | OUTPATIENT
Start: 2019-01-01 | End: 2019-01-01

## 2019-01-01 RX ORDER — MORPHINE SULFATE IN 0.9 % NACL 1 MG/ML
1 PLASTIC BAG, INJECTION (ML) INTRAVENOUS CONTINUOUS PRN
Status: DISCONTINUED | OUTPATIENT
Start: 2019-01-01 | End: 2019-01-01

## 2019-01-01 RX ORDER — MORPHINE SULFATE 2 MG/ML
1 INJECTION, SOLUTION INTRAMUSCULAR; INTRAVENOUS
Status: DISCONTINUED | OUTPATIENT
Start: 2019-01-01 | End: 2019-01-01

## 2019-01-01 RX ORDER — METOPROLOL SUCCINATE 50 MG/1
50 TABLET, EXTENDED RELEASE ORAL
Status: DISCONTINUED | OUTPATIENT
Start: 2019-01-01 | End: 2019-01-01

## 2019-01-01 RX ORDER — SODIUM CHLORIDE 9 MG/ML
INJECTION, SOLUTION INTRAVENOUS ONCE
Status: DISCONTINUED | OUTPATIENT
Start: 2019-01-01 | End: 2019-01-01

## 2019-01-01 RX ORDER — SODIUM CHLORIDE 0.9 % (FLUSH) 0.9 %
10 SYRINGE (ML) INJECTION AS NEEDED
Status: DISCONTINUED | OUTPATIENT
Start: 2019-01-01 | End: 2019-01-01

## 2019-01-01 RX ORDER — DEXTROSE MONOHYDRATE 25 G/50ML
50 INJECTION, SOLUTION INTRAVENOUS AS NEEDED
Status: DISCONTINUED | OUTPATIENT
Start: 2019-01-01 | End: 2019-01-01

## 2019-01-01 RX ORDER — PREDNISONE 20 MG/1
60 TABLET ORAL DAILY
Status: DISCONTINUED | OUTPATIENT
Start: 2019-01-01 | End: 2019-01-01

## 2019-01-01 RX ORDER — HEPARIN SODIUM 5000 [USP'U]/ML
5000 INJECTION, SOLUTION INTRAVENOUS; SUBCUTANEOUS EVERY 8 HOURS SCHEDULED
Status: DISCONTINUED | OUTPATIENT
Start: 2019-01-01 | End: 2019-01-01

## 2019-01-01 RX ORDER — METHYLPREDNISOLONE SODIUM SUCCINATE 125 MG/2ML
60 INJECTION, POWDER, LYOPHILIZED, FOR SOLUTION INTRAMUSCULAR; INTRAVENOUS EVERY 8 HOURS
Status: DISCONTINUED | OUTPATIENT
Start: 2019-01-01 | End: 2019-01-01

## 2019-01-01 RX ORDER — ONDANSETRON HYDROCHLORIDE 8 MG/1
8 TABLET, FILM COATED ORAL EVERY 8 HOURS PRN
Status: DISCONTINUED | OUTPATIENT
Start: 2019-01-01 | End: 2019-01-01

## 2019-01-01 RX ORDER — LORAZEPAM 2 MG/ML
1 INJECTION INTRAMUSCULAR EVERY 4 HOURS PRN
Status: DISCONTINUED | OUTPATIENT
Start: 2019-01-01 | End: 2019-01-01

## 2019-01-01 RX ORDER — DIPHENHYDRAMINE HCL 25 MG
25 CAPSULE ORAL ONCE
Status: COMPLETED | OUTPATIENT
Start: 2019-01-01 | End: 2019-01-01

## 2019-01-01 RX ORDER — LORAZEPAM 2 MG/ML
2 INJECTION INTRAMUSCULAR EVERY 4 HOURS PRN
Status: DISCONTINUED | OUTPATIENT
Start: 2019-01-01 | End: 2019-01-01

## 2019-01-01 RX ORDER — SCOLOPAMINE TRANSDERMAL SYSTEM 1 MG/1
1 PATCH, EXTENDED RELEASE TRANSDERMAL
Status: DISCONTINUED | OUTPATIENT
Start: 2019-01-01 | End: 2019-01-01

## 2019-01-01 RX ORDER — HYDROCODONE BITARTRATE AND ACETAMINOPHEN 7.5; 325 MG/1; MG/1
1 TABLET ORAL EVERY 6 HOURS PRN
Status: DISCONTINUED | OUTPATIENT
Start: 2019-01-01 | End: 2019-01-01

## 2019-01-01 RX ORDER — DIPHENHYDRAMINE HCL 25 MG
CAPSULE ORAL
Status: COMPLETED
Start: 2019-01-01 | End: 2019-01-01

## 2019-01-01 RX ORDER — GUAIFENESIN 600 MG
600 TABLET, EXTENDED RELEASE 12 HR ORAL 2 TIMES DAILY
Status: DISCONTINUED | OUTPATIENT
Start: 2019-01-01 | End: 2019-01-01

## 2019-01-01 RX ORDER — ONDANSETRON 4 MG/1
4 TABLET, ORALLY DISINTEGRATING ORAL EVERY 6 HOURS PRN
Status: DISCONTINUED | OUTPATIENT
Start: 2019-01-01 | End: 2019-01-01

## 2019-01-01 RX ORDER — POTASSIUM CHLORIDE 20 MEQ/1
40 TABLET, EXTENDED RELEASE ORAL EVERY 4 HOURS
Status: COMPLETED | OUTPATIENT
Start: 2019-01-01 | End: 2019-01-01

## 2019-01-01 RX ORDER — QUINIDINE GLUCONATE 324 MG
1 TABLET, EXTENDED RELEASE ORAL DAILY
Status: DISCONTINUED | OUTPATIENT
Start: 2019-01-01 | End: 2019-01-01

## 2019-01-01 RX ORDER — MORPHINE SULFATE 2 MG/ML
INJECTION, SOLUTION INTRAMUSCULAR; INTRAVENOUS
Status: COMPLETED
Start: 2019-01-01 | End: 2019-01-01

## 2019-01-01 RX ORDER — GLYCOPYRROLATE 0.2 MG/ML
0.4 INJECTION, SOLUTION INTRAMUSCULAR; INTRAVENOUS
Status: DISCONTINUED | OUTPATIENT
Start: 2019-01-01 | End: 2019-01-01

## 2019-01-01 RX ORDER — ACETAMINOPHEN 325 MG/1
TABLET ORAL
Status: COMPLETED
Start: 2019-01-01 | End: 2019-01-01

## 2019-01-01 RX ORDER — FUROSEMIDE 40 MG/1
40 TABLET ORAL EVERY 8 HOURS PRN
Status: DISCONTINUED | OUTPATIENT
Start: 2019-01-01 | End: 2019-01-01

## 2019-01-01 RX ORDER — SODIUM CHLORIDE 0.9 % (FLUSH) 0.9 %
3 SYRINGE (ML) INJECTION AS NEEDED
Status: DISCONTINUED | OUTPATIENT
Start: 2019-01-01 | End: 2019-01-01

## 2019-01-01 RX ORDER — DIPHENHYDRAMINE HCL 25 MG
CAPSULE ORAL
Status: DISCONTINUED
Start: 2019-01-01 | End: 2019-01-01

## 2019-01-01 RX ORDER — FUROSEMIDE 10 MG/ML
40 INJECTION INTRAMUSCULAR; INTRAVENOUS
Status: DISCONTINUED | OUTPATIENT
Start: 2019-01-01 | End: 2019-01-01

## 2019-01-01 RX ADMIN — DIPHENHYDRAMINE HCL 25 MG: 25 MG CAPSULE ORAL at 11:42:00

## 2019-01-01 RX ADMIN — ACETAMINOPHEN 650 MG: 325 TABLET ORAL at 10:27:00

## 2019-01-01 RX ADMIN — DIPHENHYDRAMINE HCL 25 MG: 25 MG CAPSULE ORAL at 10:27:00

## 2019-01-01 RX ADMIN — ACETAMINOPHEN 650 MG: 325 TABLET ORAL at 11:41:00

## 2019-01-01 RX ADMIN — ACETAMINOPHEN 650 MG: 325 TABLET ORAL at 12:44:00

## 2019-01-01 RX ADMIN — DIPHENHYDRAMINE HCL 25 MG: 25 MG CAPSULE ORAL at 12:44:00

## 2019-03-13 PROBLEM — R79.89 ELEVATED TROPONIN: Status: RESOLVED | Noted: 2018-07-01 | Resolved: 2019-01-01

## 2019-03-13 PROBLEM — R77.8 ELEVATED TROPONIN: Status: RESOLVED | Noted: 2018-07-01 | Resolved: 2019-01-01

## 2019-04-04 PROBLEM — C78.00 MALIGNANT NEOPLASM METASTATIC TO LUNG, UNSPECIFIED LATERALITY (HCC): Status: ACTIVE | Noted: 2019-01-01

## 2019-04-04 PROBLEM — C16.3 MALIGNANT NEOPLASM OF PYLORIC ANTRUM (HCC): Status: ACTIVE | Noted: 2019-01-01

## 2019-04-04 PROBLEM — R59.0 ABDOMINAL LYMPHADENOPATHY: Status: ACTIVE | Noted: 2019-01-01

## 2019-04-05 NOTE — TELEPHONE ENCOUNTER
Informed 's office that we are able to schedule pt for a transfusion later today, but due to history of heart disease we will do one unit today and one unit on Monday. Office agreeable to this.

## 2019-04-05 NOTE — PROGRESS NOTES
Moe Green presents for one unit PRBC 6.5. Complaints of shortness of breath with exertion. History of heart infection which reduced ejection fraction of his heart. Has dry tight cough, states from oral medication that caused that.  Appetite ok, does get f

## 2019-04-08 NOTE — PROGRESS NOTES
Moody Blizzard presents for second unit PRBC 4/5/19 HGB  6.3. Ambulated from waiting room with steady gait. He states he is feel slightly better than last week. He states he has shortness of breath with exertion and fatigue.   Was taking morning walks not been

## 2019-05-09 NOTE — PROGRESS NOTES
Pt to infusion for 2 units PRBC. Labs from 5/7 Hgb 7.1. Pt reports feeling weak and fatigued. 2nd cycle of chemo yesterday at [facility]. CADD pump currently infusing. Pre-medicated as ordered. PIV started in R lateral AC x1 attempt.    Transfusions tolerated

## 2019-06-18 PROBLEM — C79.51 BONE METASTASES (HCC): Status: ACTIVE | Noted: 2019-01-01

## 2019-06-18 PROBLEM — C79.51 BONE METASTASES: Status: ACTIVE | Noted: 2019-01-01

## 2019-08-26 PROBLEM — T45.1X5A CHEMOTHERAPY-INDUCED NEUROPATHY: Status: ACTIVE | Noted: 2019-01-01

## 2019-08-26 PROBLEM — G62.0 CHEMOTHERAPY-INDUCED NEUROPATHY: Status: ACTIVE | Noted: 2019-01-01

## 2019-08-26 PROBLEM — T45.1X5A CHEMOTHERAPY-INDUCED NEUROPATHY (HCC): Status: ACTIVE | Noted: 2019-01-01

## 2019-08-26 PROBLEM — G62.0 CHEMOTHERAPY-INDUCED NEUROPATHY (HCC): Status: ACTIVE | Noted: 2019-01-01

## 2019-09-20 PROBLEM — R60.0 EDEMA, LOWER EXTREMITY: Status: ACTIVE | Noted: 2019-01-01

## 2019-09-20 PROBLEM — R53.0 NEOPLASTIC MALIGNANT RELATED FATIGUE: Status: ACTIVE | Noted: 2019-01-01

## 2019-09-27 PROBLEM — Z79.4 CONTROLLED TYPE 2 DIABETES MELLITUS WITHOUT COMPLICATION, WITH LONG-TERM CURRENT USE OF INSULIN (HCC): Status: ACTIVE | Noted: 2019-01-01

## 2019-09-27 PROBLEM — R73.03 PREDIABETES: Status: RESOLVED | Noted: 2018-07-19 | Resolved: 2019-01-01

## 2019-09-27 PROBLEM — E11.9 CONTROLLED TYPE 2 DIABETES MELLITUS WITHOUT COMPLICATION, WITH LONG-TERM CURRENT USE OF INSULIN (HCC): Status: ACTIVE | Noted: 2019-01-01

## 2019-10-03 PROBLEM — J96.01 ACUTE HYPOXEMIC RESPIRATORY FAILURE (HCC): Status: ACTIVE | Noted: 2019-01-01

## 2019-10-03 NOTE — ED NOTES
Per pt wishes and NP's approval pt is still receiving 2L/min flow of O2 through Nasal Cannula sitting upright at edge of bed. At this time pt had 300cc's output of urine. He remains in NAD w/VS stable.

## 2019-10-03 NOTE — ED PROVIDER NOTES
Patient Seen in: [facility] AND [facility] Emergency Department      History   Patient presents with:  Dyspnea NEMO SOB (respiratory)    Stated Complaint: sob    HPI    61-year-old male with history of CHF, diabetes, hypertension, stomach cancer, currently gettin of Systems   Constitutional: Negative for appetite change, fatigue and fever. HENT: Negative for congestion, ear pain and sore throat. Eyes: Negative for pain, discharge and redness. Respiratory: Positive for shortness of breath.  Negative for cough person, place, and time. 5/5 strength in b/l UEs and LEs, normal sensation in all extremities, normal finger to nose b/l, normal gait, no facial asymmetry, normal speech     Skin: Skin is warm and dry. No rash noted. He is not diaphoretic.    Psychiatric: Platelet Count 597 708 - 450 10^3/uL    RDW 17.7 (H) 11.5 - 16.0 %    MPV 10.4 7.0 - 11.5 fL    Neutrophils Absolute 16.13 (H) 1.30 - 6.70 10ˆ3/µL    Lymphocytes Absolute 2.37 0.90 - 4.00 10ˆ3/µL    Monocytes Absolute 3.39 (H) 0.10 - 0.60 10ˆ3/µL    Eosino CHF when compared to July 1, 2018. 2. Cardiomegaly. 3. Tortuous aorta. 4. Demineralization. 5. Scoliosis. 6. Osteoarthritis. 7. Catheter in the superior vena cava. Dictated by (CST):  Jaimee Aguilar MD on 10/03/2019 at 14:16     Approved by (CST): Was that you schedule follow up care with a primary care provider within the next three months to obtain basic health screening including reassessment of your blood pressure.     Medications Prescribed:  Current Discharge Medication List                   Prese

## 2019-10-03 NOTE — CONSULTS
Pulmonary Consult     Assessment / Plan:  1. Acute hypoxic respiratory failure - due to heart failure exacerbation. Pneumonia and pneumonitis are less likely, but stil possible  - diuresis  - abx as below  - okay to hold steroids  - wean O2 as able  2.  Pos Performed by Doe Sterling MD at [address] [place] [address]         (Not in a hospital admission)    No outpatient medications have been marked as taking for the 10/3/19 encounter [facility] Encounter).      Iodinated Diagnosti*    NAUSEA AND VOMITING   S

## 2019-10-03 NOTE — PROGRESS NOTES
[facility] Dosing Service    Initial Pharmacokinetic Consult for Vancomycin Dosing     Yannick Viveros is a 68year old male who is being treated for pneumonia. Pharmacy has been asked to dose Vancomycin by Dr. Mayur Mcbride.     He is allergic to iodinated d

## 2019-10-03 NOTE — H&P
[facility] Hospitalist Team  History and Physical  Admit Date:  10/3/19    ASSESSMENT / PLAN:   69 yo male with metastatic gastric cancer with mets to lymph node, lung and bone, chronic systolic CHF, DM type II who has been tx for possible pneumonitis with steroi [phone]  10/3/2019      HISTORY:   CC: Patient presents with:  Dyspnea NEMO SOB (respiratory)       PCP: Bhavin Eric MD    History of Present Illness:     67 yo male with metastatic gastric cancer with mets to lymph node, lung and bone, chronic s 7. 1   • Essential hypertension    • High blood pressure    • Sleep apnea     recently diagnosed    • Stomach cancer [facility])    • Visual impairment     reading glasses         PSH  Past Surgical History:   Procedure Laterality Date   • COLONOSCOPY  2008    no LATE entry, seen and examined 10/3    Agree w above     Gastric ca w mets to lungs/bones/peritoneal seeding w sob x 3 days, stable cough, no blood, increased leg swelling, given prednisone 60 mg daily since 9/27     O:  Exam  Gen: No acute distress, aler

## 2019-10-03 NOTE — PROGRESS NOTES
ASSESSMENT/PLAN:    Impression 1. Acute systolic heart failure in a patient with a known dilated cardiomyopathy last EF of 35%. 2.  Metastatic gastric cancer currently receiving chemotherapy. 3.  History of a cardiomyopathy.   4.  History of sleep apn MOUTH TWICE DAILY BEFORE MEALS Disp: 60 tablet Rfl: 4   HYDROcodone-acetaminophen 7.5-325 MG Oral Tab Take 1 tablet by mouth every 6 (six) hours as needed for Pain.  Disp: 30 tablet Rfl: 0   insulin glargine 100 UNIT/ML Subcutaneous Solution Pen-injector In REVIEW OF SYSTEMS:   CONS:denies fever, chills, significant weight change. CV:see HPI. RESP:denies chronic cough, hemoptysis. GI:denies melena, hematochezia. :denies hematuria. INTEG:no new rashes. MS:no limiting arthritis.  NEURO:no localized deficit

## 2019-10-04 NOTE — CM/SW NOTE
10/4: SW received MDO to verify social support, home health evaluation and medication as well as MDO for POLST.      SW met with patient he states that he lives home alone in a ranch house with a basement (that he does not go down into), with 3 steps to ent

## 2019-10-04 NOTE — PROGRESS NOTES
Received pt from ED ~1700, on 5L NC but this RN increased to 7L d/t SpO2 90%. Breathing slightly labored. Able to ambulate from cart to bed. Denies pain. Bedside belongings placed in closet. Friend at bedside. Respiratory panel sent. Med rec done.

## 2019-10-04 NOTE — PROGRESS NOTES
Assessment and Plan:     1. Acute hypoxemic respiratory failure (HCC)  2.  Metastatic gastric CA  3. HFrEF  - EF 40% (10/4/19)  - 10-15% July 2018  - ryan mcarthur    PLAN:  - diurese 40 IV TID  - Entresto  - metoprolol      Subjective:     Weak but dyspnea pleural effusions. 5. Low-density appearance of the intracardiac blood pool raises the possibility of underlying anemia. Correlate with hematologic parameters.   6. Dilatation of the main pulmonary artery trunk may relate to underlying pulmonary hypertens

## 2019-10-04 NOTE — OCCUPATIONAL THERAPY NOTE
Orders received, chart reviewed. RN Cori Rodriugez cleared pt for participation in occupational therapy evaluation. Upon arrival, pt seated upright in chair and coughing, de-sat to 86% despite RN increasing O2 from 4 to 5L. Pt with bout of coughing persisting.  RN

## 2019-10-04 NOTE — PLAN OF CARE
VSS. Unable to lower O2 due to desaturation. Stable on 7L NC   +orthopnea. +dyspnea with exertion. -fevers. Denies any pain. Reports SOB has improved. Swelling  to bilateral LE has improved.          Problem: RESPIRATORY - ADULT  Goal: Achieves optimal ve

## 2019-10-04 NOTE — PLAN OF CARE
Patient sat in tripod position in chair most of the day. Desats to 80s and short of breath on exertion and with coughing. Weaned down to 4L, back on 6L due to frequent coughing episodes.  Patient received duoneb and is now sitting up straight and stating at Assess and instruct to report SOB or any respiratory difficulty  - Respiratory Therapy support as indicated  - Manage/alleviate anxiety  - Monitor for signs/symptoms of CO2 retention  Outcome: Progressing     Problem: Patient/Family Goals  Goal: Patient/Fa assistance  - Assess pain using appropriate pain scale  - Administer analgesics based on type and severity of pain and evaluate response  - Implement non-pharmacological measures as appropriate and evaluate response  - Consider cultural and social influenc

## 2019-10-04 NOTE — PROGRESS NOTES
[facility] Hospitalist Team  Progress Note    Shantisarah Juanitanarciso Patient Status:  Inpatient    1945 MRN [id number]   Location [facility] 2W/SW Attending Remy Cain MD   Hosp Day # 1 PCP Joann Nielson MD     CC: Follow Up  PCP: Joann Nielson, bleeding  -follow     GERD  -PPI     GOC  -DNR  -POA, friend     BRENNA brito in am  -IVF,none  -diet-ADA     Prophy  -SCD  -heparin     Dispo  -pending clinical coarse  PCP: Dae Vieyra MD        Concerns regarding plan of care were discussed with pa 50 mL 50 mL Intravenous PRN   Glucose-Vitamin C (DEX-4) chewable tab 4 tablet 4 tablet Oral Q15 Min PRN   glucose (DEX4) oral liquid 15 g 15 g Oral Q15 Min PRN   Normal Saline Flush 0.9 % injection 3 mL 3 mL Intravenous PRN   Heparin Sodium (Porcine) 5000 rate and rhythm, no peripheral edema  Abd: Abdomen soft, nontender, nondistended, no organomegaly, bowel sounds present  MSK: extensive edema up to knees b/l  Skin: no rashes or lesions  Neuro: AO*3, motor intact, no sensory deficits  Psyc: appropriate moo

## 2019-10-04 NOTE — PHYSICAL THERAPY NOTE
Spoke with COLTON Kumar who approves participation. However, upon presenting to room patient is noted to have significant coughing and SpO2 ranging from 85-88%.  Patient is unable to carry on a conversation due to cough, near the end able to say a bit more an

## 2019-10-04 NOTE — PROGRESS NOTES
Critical Care Progress Note     Assessment / Plan:  1. Acute hypoxic respiratory failure - due to heart failure exacerbation.  Pneumonia and pneumonitis are less likely, but remain possible  - volume management per cardiology  - abx as below  - monitor off

## 2019-10-04 NOTE — CONSULTS
Hematology/Oncology Consult Note        NAME: Tori Severino - ROOM: 214/214-A - MRN: [id number] - Age: 68year old - : 1945    Reason for Consult:  Metastatic gastric cancer    Patient is a 68 y.o male with a past medical history of metastatic g 3.375 g Intravenous Q8H   • Insulin Regular Human  1-5 Units Subcutaneous 4 times per day   • Heparin Sodium (Porcine)  5,000 Units Subcutaneous Q8H [address]   • Sacubitril-Valsartan  1 tablet Oral BID   • insulin detemir  20 Units Subcutaneous Nightly   • Arsen disease, overall nonspecific, possibly infectious/inflammatory or pulmonary edema. 2. Mild interval improvement in previously seen mediastinal lymphadenopathy.   3. Diffuse gastric wall thickening, particularly gastric antrum, relatively stable since previ

## 2019-10-04 NOTE — PROGRESS NOTES
Agree w above     S: less sob but coughing     O:  Exam  Gen: No acute distress, alert and oriented x3, no focal neurologic deficits  Heent: NC AT, mucous memb clear, PERRLA, neck supple  Pulm: Lungs clear, normal respiratory effort  CV: Heart with regular

## 2019-10-05 NOTE — PROGRESS NOTES
[facility] Hospitalist Team  Progress Note    Sukhdeep Arevalo Patient Status:  Inpatient    1945 MRN [id number]   Location [facility] 2W/SW Attending Dieudonne Escobar MD   Hosp Day # 2 PCP Ivy Toth MD     CC: Follow Up  PCP: Ivy Toth, port  RESP: non labored, rales   CARDIO: Regular, no murmur  ABD: soft, NT, ND, BS+  EXTREMITIES: pitting edema of B/L LE    DIAGNOSTIC DATA:   Labs:     Recent Labs   Lab 10/03/19  1204 10/03/19  1346 10/04/19  0431 10/05/19  0505 10/05/19  0953   WBC 21. (Toprol XL) 24 hr tab 75 mg 75 mg Oral Daily   ondansetron (ZOFRAN) tab 8 mg Oral Q8H PRN   Pantoprazole Sodium (PROTONIX) EC tab 40 mg 40 mg Oral QAM AC   Prochlorperazine Maleate (COMPAZINE) tab 10 mg 10 mg Oral Q6H PRN   furosemide (LASIX) injection 40

## 2019-10-05 NOTE — PROGRESS NOTES
Assessment and Plan:     1. Acute hypoxemic respiratory failure (HCC)  2. Metastatic gastric CA  3. Acute on chronic HFrEF  - EF 40% (10/4/19)  - 10-15% July 2018  - ryan mcarthur  4.  Nonischemic cardiomyopathy    PLAN:  - diurese 40 IV TID  - Entresto; diana 4. Trace bilateral pleural effusions. 5. Low-density appearance of the intracardiac blood pool raises the possibility of underlying anemia. Correlate with hematologic parameters.   6. Dilatation of the main pulmonary artery trunk may relate to underlying

## 2019-10-05 NOTE — PROGRESS NOTES
[facility] AND CLINICS  Progress Note    Chauncey Dean Patient Status:  Inpatient    1945 MRN [id number]   Location [facility] 2W/SW Attending Rashida Cordon MD   [address] Day # 2 PCP Bhavin Eric MD     Subjective:  Feeling OK this evening.   at 12:44     Approved by (CST): Alberto Fitch MD on 10/04/2019 at 12:53          Xr Chest Ap Portable  (cpt=71045)    Result Date: 10/3/2019  CONCLUSION:  1. Increasing CHF when compared to July 1, 2018. 2. Cardiomegaly. 3. Tortuous aorta.  4. Lutricia Duty

## 2019-10-05 NOTE — PLAN OF CARE
Problem: CARDIOVASCULAR - ADULT  Goal: Maintains optimal cardiac output and hemodynamic stability  Description  INTERVENTIONS:  - Monitor vital signs, rhythm, and trends  - Monitor for bleeding, hypotension and signs of decreased cardiac output  - Evalua Problem: Patient Centered Care  Goal: Patient preferences are identified and integrated in the patient's plan of care  Description  Interventions:  - What would you like us to know as we care for you?  \"I want to go home\"  - Provide timely, complete, an

## 2019-10-05 NOTE — OCCUPATIONAL THERAPY NOTE
OCCUPATIONAL THERAPY EVALUATION - INPATIENT     Room Number: 214/214-A  Evaluation Date: 10/5/2019  Type of Evaluation: Initial       Physician Order: IP Consult to Occupational Therapy  Reason for Therapy: ADL/IADL Dysfunction and Discharge Planning    OC and safety for ADLS and t/fs and educated Pt on energy conservation.      DISCHARGE RECOMMENDATIONS  OT Discharge Recommendations: Sub-acute rehabilitation  OT Device Recommendations: TBD    PLAN  OT Treatment Plan: Energy conservation/work simplification t that will assist him w/ IADLS and appointments, but are not able to stay with him. Pt has a tub and can borrow a tub chair. Use of Assistive Device(s): none    Prior Level of [place]: independent    SUBJECTIVE  Pt had no pain c/o.  Pt did report feel NT  Feeding: NT  Bathing: NT  Toileting: mod I  Upper Extremity Dressing: NT  Lower Extremity Dressing: mod a    Education Provided: Pt was educated in role of OT, pacing w/ activity, safety w/ ADLS and t/fs.   Patient End of Session: Up in chair;Needs met;

## 2019-10-05 NOTE — OCCUPATIONAL THERAPY NOTE
Attempted to see Pt for OT tooal, spoke w/ nurse Juan Diego Rocha, Pt Hgb 6.9 and await redraw to check Hgb, will follow up as schedule allows.

## 2019-10-05 NOTE — PHYSICAL THERAPY NOTE
Pt was to be seen for PT eval. Consulted w/ RN. HGB is 6.9. Will re-attempt time-permitting if appropriate to see pt.

## 2019-10-05 NOTE — PHYSICAL THERAPY NOTE
PHYSICAL THERAPY EVALUATION - INPATIENT     Room Number: 214/214-A  Evaluation Date: 10/5/2019  Type of Evaluation: Initial   Physician Order: PT Eval and Treat    Presenting Problem: acute hypoxemic respiratory failure;  Hx Gastric Ca w/ mets to bone, [place] conservation; Body mechanics;Balance training;Stair training;Stoop training;Strengthening  Rehab Potential : Fair  Frequency (Obs): 5x/week       PHYSICAL THERAPY MEDICAL/SOCIAL HISTORY     History related to current admission: Per H&P: \"73 yo male with me 69646, [address] N/A 3/26/2019    Performed by Margarito Todd MD at [address]  Type of Home: House   Home Layout: One level  Stairs to Enter : 3     Stairs to Bedroom: 0(does not go down to basement)       Lives With: Al (G-Code): CK    FUNCTIONAL ABILITY STATUS  Gait Assessment   Gait Assistance: Contact guard assist  Distance (ft): 5  Assistive Device: None     Stoop/Curb Assistance: Not tested       Bed Mobility: NT    Transfers: CGA    Exercise/Education Provided:  Bod

## 2019-10-05 NOTE — PROGRESS NOTES
[facility] Patient Status:  Inpatient    1945 MRN [id number]   Location [facility] 2W/SW Attending Shay Zapata MD   Hosp Day # 2 PCP Daja Leo MD     SUBJECTIVE: no new events; feels much better     OBJECTIV Oral, Q6H PRN  •  furosemide (LASIX) injection 40 mg, 40 mg, Intravenous, TID  •  azithromycin (ZITHROMAX) 500 mg in sodium chloride 0.9% 250 mL IVPB, 500 mg, Intravenous, Q24H  •  vancomycin HCl (FIRVANQ) 50 MG/ML oral solution 125 mg, 125 mg, Oral, Daily

## 2019-10-06 PROBLEM — C16.9 GASTRIC CANCER (HCC): Status: ACTIVE | Noted: 2019-01-01

## 2019-10-06 NOTE — PROGRESS NOTES
[facility] HOSP - [facility]    Cardiology Progress Note    Sukhdeep Arevalo Patient Status:  Inpatient    1945 MRN [id number]   Location [facility] 2W/SW Attending Dieudonne Escobar MD   Hosp Day # 3 PCP Ivy Toth MD       Impression/Plan: respiratory or constitutional distress. HEENT: Normocephalic, anicteric sclera, neck supple, no thyromegaly or adenopathy. Neck: No JVD, carotids 2+, no bruits. Cardiac: tachycardiac, regular  Lungs: Clear without wheezes, rales, rhonchi or dullness.   Glenice Purdue Subcutaneous Nightly   ipratropium-albuterol (DUONEB) nebulizer solution 3 mL 3 mL Nebulization Q6H PRN   Piperacillin Sod-Tazobactam So (ZOSYN) 3.375 g in dextrose 5 % 100 mL ADD-vantage 3.375 g Intravenous Q8H   dextrose 50 % injection 50 mL 50 mL Cyndirachel Carls

## 2019-10-06 NOTE — PLAN OF CARE
Patient displaying signs of respiratory distress (increased WOB, tachypnea, dyspnea at rest and with even small activities). Respiratory was contacted and request for Vapotherm made. MD notified and orders requested.

## 2019-10-06 NOTE — PROGRESS NOTES
[facility] Patient Status:  Inpatient    1945 MRN [id number]   Location [facility] 2W/SW Attending James Hidalgo MD   Hosp Day # 3 PCP Hernandez Lyons MD     SUBJECTIVE: requiring higher FiO2.   Had some coughing whi tablet, 1 tablet, Oral, BID  •  HYDROcodone-acetaminophen (NORCO) 7.5-325 MG per tab 1 tablet, 1 tablet, Oral, Q6H PRN  •  Metoprolol Succinate ER (Toprol XL) 24 hr tab 75 mg, 75 mg, Oral, Daily  •  ondansetron (ZOFRAN) tab, 8 mg, Oral, Q8H PRN  •  Pantopr hygiene  2. Possible PNA  - zosyn/azithro- limit to 5d course of abx  - MRSA screen negative, stop IV vanco  - cultures and RVP negative, PCT negative  - on oral vanco for cdif prophylaxis  3.  CHF- has LVH and LVEF of 40% as well as some component of diast

## 2019-10-06 NOTE — SPIRITUAL CARE NOTE
Patient was alone at the time of visit. Patient was coughing repeatedly and stated that he has leg swollen and is going through chemo. Patient identify as \"I am\" chanelle tradition.  provided supportive presence and active listening and a prayer.  ML

## 2019-10-06 NOTE — PROGRESS NOTES
[facility] Hospitalist Team  Progress Note    Larissa Crow Patient Status:  Inpatient    1945 MRN [id number]   Location [facility] 2W/SW Attending Cameron Giron MD   Hosp Day # 3 PCP Nikolas Potts MD     CC: Follow Up  PCP: Nikolas Potts, long life, will make hospice  Maynor Serna MD    SUBJECTIVE:   More sob today      OBJECTIVE:   Blood pressure 123/87, pulse 93, temperature 97.8 °F (36.6 °C), temperature source Temporal, resp.  rate 24, height 5' 10\" (1.778 m), weight 192 lb 7.4 oz (87.3 ipratropium-albuterol (DUONEB) nebulizer solution 3 mL 3 mL Nebulization Q6H PRN   Piperacillin Sod-Tazobactam So (ZOSYN) 3.375 g in dextrose 5 % 100 mL ADD-vantage 3.375 g Intravenous Q8H   dextrose 50 % injection 50 mL 50 mL Intravenous PRN   Glucose-V General Emerald MD on 10/04/2019 at 12:44     Approved by (CST): General Emerald MD on 10/04/2019 at 12:53          Xr Chest Ap Portable  (cpt=71045)    Result Date: 10/6/2019  CONCLUSION:  1.  Extensive multifocal airspace disease bilaterally, compatible w

## 2019-10-06 NOTE — PLAN OF CARE
Alert, oriented x 4, satting 91-94% on 6 L o2 via nasal cannula. Orthopneic breathing, some improvement with positioning. Bumex gtt started. Fluid restriction 1200 mL. PT/OT. Patient denies pain. HR NSR in morning, but persistently 110-116 by evening.  BP instruct to report SOB or any respiratory difficulty  - Respiratory Therapy support as indicated  - Manage/alleviate anxiety  - Monitor for signs/symptoms of CO2 retention  Outcome: Progressing     Problem: PAIN - ADULT  Goal: Verbalizes/displays adequate Diabetes/Glucose Control  Goal: Glucose maintained within prescribed range  Description  INTERVENTIONS:  - Monitor Blood Glucose as ordered  - Assess for signs and symptoms of hyperglycemia and hypoglycemia  - Administer ordered medications to maintain glu identify destructive displacement of feelings onto other individuals  - Invite use of sacraments/rituals/ceremonies as appropriate (e.g. - confession, anointing, smudging)  -.  Refer patient to formal counseling and/or to chanelle community for further support

## 2019-10-06 NOTE — PLAN OF CARE
Received call from lab regarding critical hgb. MD notified and orders obtained for 1 unit PRBCs. Order is to transfuse the 1 unit, recheck hgb 1 hour post infusion, and contact MD if hgb remains below 7 for additional orders.  Consent has been printed and s

## 2019-10-06 NOTE — HOSPICE RN NOTE
PT admitted to in hospice for gastric cancer. Treating pain/dyspnea. Pt very dyspneic and using accessory muscles. On 30 lpm vapotherm sating 89%. Pt with abd pain as well. PT states that he saw his wife die at home on hospice and \"knows the signs\".  He

## 2019-10-06 NOTE — CM/SW NOTE
IMELDA received for hospice. SW spoke with [facility] [id number] who is aware of the case. Pt is not imminent, but meets criteria due to O2 needs. Packet already sent via [email or web address] by liaison.     ANTOINE Welsh, [address] [place] [address]

## 2019-10-06 NOTE — PLAN OF CARE
Problem: CARDIOVASCULAR - ADULT  Goal: Maintains optimal cardiac output and hemodynamic stability  Description  INTERVENTIONS:  - Monitor vital signs, rhythm, and trends  - Monitor for bleeding, hypotension and signs of decreased cardiac output  - Evalua pre-medicate as appropriate  Outcome: Progressing     Problem: Diabetes/Glucose Control  Goal: Glucose maintained within prescribed range  Description  INTERVENTIONS:  - Monitor Blood Glucose as ordered  - Assess for signs and symptoms of hyperglycemia and

## 2019-10-07 NOTE — PLAN OF CARE
Problem: COPING  Goal: Pt/Family able to verbalize concerns and demonstrate effective coping strategies  Description  INTERVENTIONS:  - Assist patient/family to identify coping skills, available support systems and cultural and spiritual values  - Provid traditions  - Initiate Spiritual Care consult as needed  Outcome: Progressing     Problem: DECISION MAKING  Goal: Pt/Family able to effectively weigh alternatives and participate in decision making related to treatment and care  Description  INTERVENTIONS: [facility] for further support work  Outcome: Progressing     Patient resting comfortably in bed. Friends at bedside. MsO4 gtt at 3mg/hr. Comfort measures in place. Angulo maintained. Bed low and locked. Call light within reach.  Scope patch and robinul

## 2019-10-07 NOTE — CM/SW NOTE
SW checked in with pt who appeared to be declining. Called friend Isaias Robles who stated that she would bring in poa. Discussed status and that he had discussed with her his  arrangements.

## 2019-10-07 NOTE — H&P
[facility] Hospitalist Team  History and Physical  Admit Date:  10/6/19    ASSESSMENT / PLAN:   69 yo male with metastatic gastric cancer with mets to lymph node, lung and bone, chronic systolic CHF, DM type II who has been tx for possible pneumonitis with steroi accessory muschles  NEUROLOGIC: moans  SKIN: no rashes  HEENT: normocephalic, normal nose, pharynx and TM's  NECK: supple,  RESPIRATORY: using stomach accessory muscles, lungs with rales   CARDIOVASCULAR: regular,nl S1 S2; no murmur, no gallop,  no rub   A Lab 10/03/19  1204 10/03/19  1346 10/04/19  0431 10/05/19  0505 10/05/19  0953 10/06/19  0340   WBC 21.92* 18.8* 21.9* 11.3*  --  11.5*   HGB 11.3* 10.3* 8.2* 6.9* 7.3* 6.0*   MCV 90.5 90.2 90.2 89.7  --  90.6    245.0 212.0 166.0  --  182.0 Approved by (CST): Liset Taylor MD on 10/06/2019 at 9:15              SEE ATTENDING NOTE BELOW    Agree w above    S: made hospice yesterday after long discussion when pt lucid    O:  Exam  Gen: lethargic  Heent: NC AT, mucous memb clear, PERRLA, neck s

## 2019-10-07 NOTE — PLAN OF CARE
Patient resting in bed, with family and friends present at bedside. Patient receiving 5 mg of morphine per hour for pain relief and dyspnea. Respirations labored at rest, accessory muscles in use, tachypnea.  Crackles present bilaterally, PRN IV lasix given body image, loss of functional status, loss of sense of self, and forgiveness  - Provide emotional and spiritual support  - Provide information about the patient’s health status with consideration of family and cultural values  - Communicate willingness to letting go of issue  - Help patient explore feelings of anger, bitterness, resentment  - Help patient/family identify and examine the situation in which these feelings are experienced  - Help patient/family identify destructive displacement of feelings ont

## 2019-10-07 NOTE — PLAN OF CARE
Double RN skin check done prior to transfer off Unit. Skin check performed by this RN and Erik Diana RN. Wounds are as follows: None    Will remain available for any further questions or concerns.

## 2019-10-07 NOTE — PROGRESS NOTES
Patient received from PCU alert and oriented with c/o pain. Morphine IVP given, patient now started on morphine drip. Angulo inserted. Patient resting comfortably in bed, friends at bedside. No distress noted.

## 2019-10-07 NOTE — HOSPICE RN NOTE
GIP DAY 2  Patient not awake nor responsive at this visit  Morphine drip in place to manage his dyspnea  PPS 10%  Using accessory muscles at times with breathing  Oxygen in place   POC was discussed with friends at bedside  POC was discussed with Sandra Meyer

## 2019-10-08 NOTE — DISCHARGE SUMMARY
[facility] Hospitalist Discharge Summary   Patient ID:  Governor Andrade  [id number]  41 year old  1945    Admit date: 10/6/2019  Discharge date: 10/7/2019-    Primary Care Physician: Fabricio Irvin MD   Attending Physician: No att. providers found steroids  4) Acute on Chronic Anemia S/P RBC's  5)GERD    Radiology:   Xr Chest Ap Portable  (cpt=71045)    Result Date: 10/6/2019  CONCLUSION:  1. Extensive multifocal airspace disease bilaterally, compatible with reported history of pneumonia.   2. Cardio

## 2019-10-08 NOTE — PLAN OF CARE
Pt  at [address]. Resusitation not attempted as pt was DNR.     Time of Death 26    Family Notified: yes, Name and Relation: Lindsay Petersen at bedside,    MD Dr Skinny Curran of [facility] Notified Y (get Rep Name and Case Number) Jimena Medina #[id number]     contac

## 2019-10-23 NOTE — DISCHARGE SUMMARY
General Medicine Discharge Summary     Patient ID:  Scarlett Cheatham  68year old  12/4/1945    Admit date: 10/3/2019    Discharge date and time: 10/6/2019 12:50 PM     Attending Physician: No att. providers found     Consults: IP CONSULT TO PULMONOLOGY  I

## (undated) NOTE — LETTER
Coleman Galvez [address], [place], [place]  [address]  INFORMED CONSENT FOR TRANSFUSION OF BLOOD OR BLOOD PRODUCTS  My physician has informed me of the nature, purpose, benefits and risks of transfusion for blood and blood components that ______________________________________________  (Signature of Patient)                                                            (Responsible party in case of Minor,